# Patient Record
Sex: MALE | Race: WHITE | NOT HISPANIC OR LATINO | Employment: FULL TIME | URBAN - METROPOLITAN AREA
[De-identification: names, ages, dates, MRNs, and addresses within clinical notes are randomized per-mention and may not be internally consistent; named-entity substitution may affect disease eponyms.]

---

## 2023-08-03 LAB
MUCUS, URINE: NORMAL /LPF
RBC, URINE: <1 /HPF (ref 0–5)
WBC, URINE: <1 /HPF (ref 0–5)

## 2023-08-04 LAB — URINE CULTURE: NO GROWTH

## 2023-08-25 PROBLEM — M16.11 PRIMARY OSTEOARTHRITIS OF RIGHT HIP: Status: ACTIVE | Noted: 2023-08-25

## 2023-08-25 PROBLEM — R31.0 HEMATURIA, GROSS: Status: ACTIVE | Noted: 2023-07-07

## 2023-08-25 PROBLEM — R10.9 FLANK PAIN: Status: ACTIVE | Noted: 2023-07-07

## 2023-08-25 PROBLEM — H65.90 SEROUS OTITIS MEDIA: Status: ACTIVE | Noted: 2019-04-24

## 2023-08-25 PROBLEM — I10 HYPERTENSION: Status: ACTIVE | Noted: 2019-05-13

## 2023-08-25 PROBLEM — M25.569 JOINT PAIN, KNEE: Status: ACTIVE | Noted: 2023-08-25

## 2023-08-25 PROBLEM — I25.10 MILD CAD: Status: ACTIVE | Noted: 2023-04-04

## 2023-08-25 PROBLEM — E66.01 MORBID OBESITY (MULTI): Status: ACTIVE | Noted: 2022-09-19

## 2023-08-25 PROBLEM — N40.1 BENIGN PROSTATIC HYPERPLASIA WITH URINARY OBSTRUCTION: Status: ACTIVE | Noted: 2023-08-25

## 2023-08-25 PROBLEM — N13.8 BENIGN PROSTATIC HYPERPLASIA WITH URINARY OBSTRUCTION: Status: ACTIVE | Noted: 2023-08-25

## 2023-08-25 PROBLEM — M25.551 RIGHT HIP PAIN: Status: ACTIVE | Noted: 2023-08-25

## 2023-08-25 PROBLEM — M10.9 GOUT: Status: ACTIVE | Noted: 2019-05-13

## 2023-08-25 PROBLEM — M54.50 ACUTE LOW BACK PAIN: Status: ACTIVE | Noted: 2021-05-25

## 2023-08-25 PROBLEM — R60.0 LOWER EXTREMITY EDEMA: Status: ACTIVE | Noted: 2021-05-25

## 2023-08-25 PROBLEM — Z96.641 STATUS POST TOTAL REPLACEMENT OF RIGHT HIP: Status: ACTIVE | Noted: 2023-08-25

## 2023-08-25 PROBLEM — R39.9 LOWER URINARY TRACT SYMPTOMS (LUTS): Status: ACTIVE | Noted: 2023-08-25

## 2023-08-25 RX ORDER — ALFUZOSIN HYDROCHLORIDE 10 MG/1
1 TABLET, EXTENDED RELEASE ORAL DAILY
COMMUNITY
End: 2023-10-04 | Stop reason: SDUPTHER

## 2023-08-25 RX ORDER — METOPROLOL SUCCINATE 200 MG/1
TABLET, EXTENDED RELEASE ORAL
COMMUNITY
Start: 2023-07-25

## 2023-08-25 RX ORDER — IBUPROFEN 800 MG/1
1 TABLET ORAL 3 TIMES DAILY PRN
COMMUNITY
Start: 2023-05-31

## 2023-08-25 RX ORDER — DICLOFENAC SODIUM 75 MG/1
1 TABLET, DELAYED RELEASE ORAL 2 TIMES DAILY
COMMUNITY
Start: 2022-06-08

## 2023-08-25 RX ORDER — OXYCODONE HYDROCHLORIDE 5 MG/1
1 TABLET ORAL EVERY 8 HOURS PRN
COMMUNITY
Start: 2022-08-12

## 2023-08-25 RX ORDER — AMLODIPINE BESYLATE 5 MG/1
1 TABLET ORAL DAILY
COMMUNITY
Start: 2023-07-25

## 2023-08-25 RX ORDER — ALLOPURINOL 100 MG/1
TABLET ORAL
COMMUNITY
Start: 2022-07-19

## 2023-08-25 RX ORDER — LISINOPRIL 20 MG/1
1 TABLET ORAL DAILY
COMMUNITY
Start: 2022-09-19

## 2023-08-25 RX ORDER — HYDROCHLOROTHIAZIDE 25 MG/1
1 TABLET ORAL DAILY
COMMUNITY
Start: 2023-07-13

## 2023-08-25 RX ORDER — LISINOPRIL 10 MG/1
TABLET ORAL
COMMUNITY
Start: 2022-07-19

## 2023-09-20 ENCOUNTER — HOSPITAL ENCOUNTER (OUTPATIENT)
Dept: DATA CONVERSION | Facility: HOSPITAL | Age: 53
Discharge: HOME | End: 2023-09-20
Payer: COMMERCIAL

## 2023-09-20 DIAGNOSIS — M1A.3720 CHRONIC GOUT DUE TO RENAL IMPAIRMENT, LEFT ANKLE AND FOOT, WITHOUT TOPHUS (TOPHI): ICD-10-CM

## 2023-09-20 DIAGNOSIS — Z00.00 ENCOUNTER FOR GENERAL ADULT MEDICAL EXAMINATION WITHOUT ABNORMAL FINDINGS: ICD-10-CM

## 2023-09-20 DIAGNOSIS — I10 ESSENTIAL (PRIMARY) HYPERTENSION: ICD-10-CM

## 2023-09-20 DIAGNOSIS — E66.01 MORBID (SEVERE) OBESITY DUE TO EXCESS CALORIES (MULTI): ICD-10-CM

## 2023-09-20 DIAGNOSIS — I25.10 ATHEROSCLEROTIC HEART DISEASE OF NATIVE CORONARY ARTERY WITHOUT ANGINA PECTORIS: ICD-10-CM

## 2023-09-20 LAB
ALBUMIN SERPL-MCNC: 4.1 GM/DL (ref 3.5–5)
ALBUMIN/GLOB SERPL: 1.6 RATIO (ref 1.5–3)
ALP BLD-CCNC: 37 U/L (ref 35–125)
ALT SERPL-CCNC: 19 U/L (ref 5–40)
ANION GAP SERPL CALCULATED.3IONS-SCNC: 12 MMOL/L (ref 0–19)
APPEARANCE PLAS: NORMAL
AST SERPL-CCNC: 20 U/L (ref 5–40)
BASOPHILS # BLD AUTO: 0.05 K/UL (ref 0–0.22)
BASOPHILS NFR BLD AUTO: 0.9 % (ref 0–1)
BILIRUB SERPL-MCNC: 0.7 MG/DL (ref 0.1–1.2)
BILIRUB UR QL STRIP.AUTO: NEGATIVE
BUN SERPL-MCNC: 10 MG/DL (ref 8–25)
BUN/CREAT SERPL: 10 RATIO (ref 8–21)
CALCIUM SERPL-MCNC: 9.8 MG/DL (ref 8.5–10.4)
CHLORIDE SERPL-SCNC: 97 MMOL/L (ref 97–107)
CHOLEST SERPL-MCNC: 152 MG/DL (ref 133–200)
CHOLEST/HDLC SERPL: 2.6 RATIO
CLARITY UR: CLEAR
CO2 SERPL-SCNC: 29 MMOL/L (ref 24–31)
COLOR SPUN FLD: NORMAL
COLOR UR: NORMAL
CREAT SERPL-MCNC: 1 MG/DL (ref 0.4–1.6)
DEPRECATED RDW RBC AUTO: 38.5 FL (ref 37–54)
DIFFERENTIAL METHOD BLD: ABNORMAL
EOSINOPHIL # BLD AUTO: 0.11 K/UL (ref 0–0.45)
EOSINOPHIL NFR BLD: 1.9 % (ref 0–3)
ERYTHROCYTE [DISTWIDTH] IN BLOOD BY AUTOMATED COUNT: 11.7 % (ref 11.7–15)
FASTING STATUS PATIENT QL REPORTED: NORMAL
GFR SERPL CREATININE-BSD FRML MDRD: 90 ML/MIN/1.73 M2
GLOBULIN SER-MCNC: 2.5 G/DL (ref 1.9–3.7)
GLUCOSE SERPL-MCNC: 92 MG/DL (ref 65–99)
GLUCOSE UR STRIP.AUTO-MCNC: NEGATIVE MG/DL
HBA1C MFR BLD: 5.4 % (ref 4–6)
HCT VFR BLD AUTO: 44.4 % (ref 41–50)
HDLC SERPL-MCNC: 58 MG/DL
HGB BLD-MCNC: 15.1 GM/DL (ref 13.5–16.5)
HGB UR QL: NEGATIVE
IMM GRANULOCYTES # BLD AUTO: 0.02 K/UL (ref 0–0.1)
KETONES UR QL STRIP.AUTO: NEGATIVE
LDLC SERPL CALC-MCNC: 77 MG/DL (ref 65–130)
LEUKOCYTE ESTERASE UR QL STRIP.AUTO: NEGATIVE
LYMPHOCYTES # BLD AUTO: 1.11 K/UL (ref 1.2–3.2)
LYMPHOCYTES NFR BLD MANUAL: 19 % (ref 20–40)
MCH RBC QN AUTO: 30.6 PG (ref 26–34)
MCHC RBC AUTO-ENTMCNC: 34 % (ref 31–37)
MCV RBC AUTO: 90.1 FL (ref 80–100)
MONOCYTES # BLD AUTO: 0.6 K/UL (ref 0–0.8)
MONOCYTES NFR BLD MANUAL: 10.3 % (ref 0–8)
NEUTROPHILS # BLD AUTO: 3.95 K/UL
NEUTROPHILS # BLD AUTO: 3.95 K/UL (ref 1.8–7.7)
NEUTROPHILS.IMMATURE NFR BLD: 0.3 % (ref 0–1)
NEUTS SEG NFR BLD: 67.6 % (ref 50–70)
NITRITE UR QL STRIP.AUTO: NEGATIVE
NRBC BLD-RTO: 0 /100 WBC
PH UR STRIP.AUTO: 7 [PH] (ref 4.6–8)
PLATELET # BLD AUTO: 249 K/UL (ref 150–450)
PMV BLD AUTO: 10 CU (ref 7–12.6)
POTASSIUM SERPL-SCNC: 4.4 MMOL/L (ref 3.4–5.1)
PROT SERPL-MCNC: 6.6 G/DL (ref 5.9–7.9)
PROT UR STRIP.AUTO-MCNC: NEGATIVE MG/DL
RBC # BLD AUTO: 4.93 M/UL (ref 4.5–5.5)
REFLEX MICROSCOPIC (UA): NORMAL
SODIUM SERPL-SCNC: 138 MMOL/L (ref 133–145)
SP GR UR STRIP.AUTO: 1.01 (ref 1–1.03)
TRIGL SERPL-MCNC: 83 MG/DL (ref 40–150)
TSH SERPL DL<=0.05 MIU/L-ACNC: 1.61 MIU/L (ref 0.27–4.2)
UROBILINOGEN UR QL STRIP.AUTO: NORMAL MG/DL (ref 0–1)
WBC # BLD AUTO: 5.8 K/UL (ref 4.5–11)

## 2023-10-04 ENCOUNTER — PROCEDURE VISIT (OUTPATIENT)
Dept: UROLOGY | Facility: CLINIC | Age: 53
End: 2023-10-04
Payer: COMMERCIAL

## 2023-10-04 DIAGNOSIS — R31.0 HEMATURIA, GROSS: ICD-10-CM

## 2023-10-04 DIAGNOSIS — N13.8 BPH WITH OBSTRUCTION/LOWER URINARY TRACT SYMPTOMS: Primary | ICD-10-CM

## 2023-10-04 DIAGNOSIS — N40.1 BPH WITH OBSTRUCTION/LOWER URINARY TRACT SYMPTOMS: Primary | ICD-10-CM

## 2023-10-04 LAB
POC APPEARANCE, URINE: CLEAR
POC BILIRUBIN, URINE: NEGATIVE
POC BLOOD, URINE: NEGATIVE
POC COLOR, URINE: YELLOW
POC GLUCOSE, URINE: NEGATIVE MG/DL
POC KETONES, URINE: NEGATIVE MG/DL
POC LEUKOCYTES, URINE: NEGATIVE
POC NITRITE,URINE: NEGATIVE
POC PH, URINE: 7 PH
POC PROTEIN, URINE: NEGATIVE MG/DL
POC SPECIFIC GRAVITY, URINE: 1.02
POC UROBILINOGEN, URINE: 0.2 EU/DL

## 2023-10-04 PROCEDURE — 52000 CYSTOURETHROSCOPY: CPT | Performed by: STUDENT IN AN ORGANIZED HEALTH CARE EDUCATION/TRAINING PROGRAM

## 2023-10-04 PROCEDURE — 99213 OFFICE O/P EST LOW 20 MIN: CPT | Performed by: STUDENT IN AN ORGANIZED HEALTH CARE EDUCATION/TRAINING PROGRAM

## 2023-10-04 RX ORDER — ALFUZOSIN HYDROCHLORIDE 10 MG/1
10 TABLET, EXTENDED RELEASE ORAL DAILY
Qty: 30 TABLET | Refills: 11 | Status: SHIPPED | OUTPATIENT
Start: 2023-10-04 | End: 2023-11-07 | Stop reason: SDUPTHER

## 2023-10-04 NOTE — PROGRESS NOTES
Patient ID: Mamadou Warren is a 52 y.o. male.    Procedures    PROCEDURE NOTE:    PREOPERATIVE DIAGNOSIS:  Gross hematuria    POSTOPERATIVE DIAGNOSIS:  BPH    OPERATION:  Flexible Cystourethroscopy      SURGEON:  Shweta Reyna MD    ANESTHESIA:  2%  lidocaine jelly    COMPLICATIONS:  None    EBL: Minimal    SPECIMEN:  Voided urine was not collected and submitted for cytology.    DISPOSITION:  The patient was discharged home after the procedure, per routine.    INDICATIONS: :  Mr. Warren is a 52 y.o. patient with a history of gross hematuria, negative CT,  who presents today for Cystoscopy.     The indications, risks and benefits of this procedure were discussed with the patient, consent was obtained prior to the procedure, and to the best of my judgement the patient seemed to understand and agree to the procedure.    PROCEDURE:  The patient  was brought into the procedure suite and informed consent was reviewed and confirmed. Vital signs were obtained prior to the procedure: There were no vitals taken for this visit..  The patient was escorted onto the stretcher, placed supine, prepped with betadine and draped in the usual standard surgical fashion.  Intraurethral 2% viscous lidocaine jelly was used for local analgesia.  A 16 Ethiopian flexible cystourethroscope was inserted into the urethra.   The penile urethra was normal.  The prostate urethra was enlarged.  Upon entering the bladder the entire bladder was surveyed in a 360 degree fashion.  The left and right ureteral orifices were in normal orthotopic position effluxing clear yellow urine, bilaterally.   There was no evidence of any bladder lesions, foreign objects, stones or evidence of any mucosal changes. The cystoscope was then retroflexed.  The bladder neck was then further examined without any evidence of lesions. The scope was then removed and in an antegrade fashion, the urethra and bladder were again resurveyed with no evidence of additional  lesions.  The cystoscope was then fully removed.   The patient tolerated the procedure well.  Vitals were stable after the procedure.  The patient was able to void and was discharged home.  Verbal and written Post procedure instructions were reviewed with the patient.    IMPRESSION:  History of hematuria likely from prostate    PLAN:  Reassure  Continue alfuzosin 10mg  FU in 1 year    Mamadou Warren is noted in assessment to have a @FLOWCONTINUOUS(18)@/10. The patient's individualized treatment will therefore consist of: Negative.

## 2023-10-04 NOTE — PROGRESS NOTES
Subjective   Patient ID: Mamadou Warren is a 52 y.o. male follow-up hematuria, LUTS. Cystoscopy.     HPI  52-year-old male light ex-smoker, with gross hematuria. He has significant obstructive LUTS. No ED.      Started alfuzosin 10 mg daily at bedtime and symptoms are significantly hany,r he would like to continue it.     Cystoscopy today 10/4/23.     Review of Systems   All other systems reviewed and are negative.        Assessment/Plan   52-year-old male light ex-smoker, with gross hematuria. He has significant obstructive LUTS. No ED.    Cystoscopy today 10/4/23 showed enlarged prostate with no lesions in the bladder     Plan:   Continue Alfuzosin 10mg orally daily  FU in 1 year    Diagnoses and all orders for this visit:  BPH with obstruction/lower urinary tract symptoms  Hematuria, gross

## 2023-11-07 DIAGNOSIS — N13.8 BPH WITH OBSTRUCTION/LOWER URINARY TRACT SYMPTOMS: ICD-10-CM

## 2023-11-07 DIAGNOSIS — N40.1 BPH WITH OBSTRUCTION/LOWER URINARY TRACT SYMPTOMS: ICD-10-CM

## 2023-11-07 RX ORDER — ALFUZOSIN HYDROCHLORIDE 10 MG/1
10 TABLET, EXTENDED RELEASE ORAL DAILY
Qty: 90 TABLET | Refills: 3 | Status: SHIPPED | OUTPATIENT
Start: 2023-11-07 | End: 2024-11-06

## 2024-01-30 ENCOUNTER — APPOINTMENT (OUTPATIENT)
Dept: ORTHOPEDIC SURGERY | Facility: CLINIC | Age: 54
End: 2024-01-30
Payer: COMMERCIAL

## 2024-02-01 ENCOUNTER — OFFICE VISIT (OUTPATIENT)
Dept: ORTHOPEDIC SURGERY | Facility: CLINIC | Age: 54
End: 2024-02-01
Payer: COMMERCIAL

## 2024-02-01 ENCOUNTER — HOSPITAL ENCOUNTER (OUTPATIENT)
Dept: RADIOLOGY | Facility: CLINIC | Age: 54
Discharge: HOME | End: 2024-02-01
Payer: COMMERCIAL

## 2024-02-01 VITALS — BODY MASS INDEX: 41.52 KG/M2 | HEIGHT: 68 IN | WEIGHT: 274 LBS

## 2024-02-01 DIAGNOSIS — M70.61 TROCHANTERIC BURSITIS OF RIGHT HIP: ICD-10-CM

## 2024-02-01 DIAGNOSIS — Z96.641 HISTORY OF TOTAL HIP ARTHROPLASTY, RIGHT: ICD-10-CM

## 2024-02-01 DIAGNOSIS — Z96.641 HISTORY OF TOTAL HIP ARTHROPLASTY, RIGHT: Primary | ICD-10-CM

## 2024-02-01 PROCEDURE — 73502 X-RAY EXAM HIP UNI 2-3 VIEWS: CPT | Mod: RIGHT SIDE | Performed by: RADIOLOGY

## 2024-02-01 PROCEDURE — 73502 X-RAY EXAM HIP UNI 2-3 VIEWS: CPT | Mod: RT

## 2024-02-01 PROCEDURE — 99215 OFFICE O/P EST HI 40 MIN: CPT | Performed by: STUDENT IN AN ORGANIZED HEALTH CARE EDUCATION/TRAINING PROGRAM

## 2024-02-01 ASSESSMENT — PAIN DESCRIPTION - DESCRIPTORS: DESCRIPTORS: ACHING

## 2024-02-01 ASSESSMENT — PAIN - FUNCTIONAL ASSESSMENT: PAIN_FUNCTIONAL_ASSESSMENT: 0-10

## 2024-02-01 ASSESSMENT — PAIN SCALES - GENERAL: PAINLEVEL_OUTOF10: 3

## 2024-02-01 NOTE — PROGRESS NOTES
JUSTICE/TKA Related Summary           L hip: N  L knee: N  R hip  8/12/2022: Primary JUSTICE (Dr. Aren Joyce at , op report in system). Doing well overall.  R knee: N  Lumbar surgery/pathology: N            CC/SUBJECTIVE/HPI            PCP: Blayne Gunn MD  Referring provider: No ref. provider found  Occupation: Not specified  Hobbies: Exercising, going to sporting events  Mamadou Warren is a 53 y.o. male presenting for follow-up for a R primary JUSTICE performed by Dr. Aren Joyce (no longer with [facility]) on 8/12/2022.  He last saw Dr. Joyce on 10/26/2022, at which time he was doing well.  Since that time, he has begun to develop lateral hip pain radiating down to his lateral knee. This pain keeps him up at night and prevents him from sleeping on that side.  He also has minor pain along his anterior hip, but this is much less of a concern for him.    R hip  Symptoms  Pain: 3/10  Onset: chronic/gradual  Duration: 3mo-1yr  Location: groin, side of hip, and buttock  Quality: dull/ache and sharp/stab  Limitations: morning stiffness, pain after activity, and night pain  Ambulation limit due to pain: unlimited but hurts later  Other symptoms: none  Treatment  Tried: OTCs  Most recent injection date: none  Assistive device: none  Treatment attempted for 3mo-1yr and is partially effective            HISTORIES (System Generated and Pt-Reported on Form Today)       Dental  Pt-reported: No active issues     PMH  Pt-reported: Denies heart/lung/kidney/liver issues, DM, stroke, seizure, bariatric surgery, anticoag, MRSA, cancer, personal/familial coagulopathies except: none  System-generated (PMH, problem list both included since EMR change caused discrepancies): No past medical history on file.   Patient Active Problem List   Diagnosis    Gout    Hematuria, gross    Hypertension    Lower extremity edema    Mild CAD    Morbid obesity (CMS/HCC)    Primary osteoarthritis of right hip    Status  post total replacement of right hip    Benign prostatic hyperplasia with urinary obstruction    Lower urinary tract symptoms (LUTS)     PSH  Pt-reported: Per above.   System-generated: No past surgical history on file.    Family Hx  Pt-reported clot/coagulopathies: none  System-generated: No family history on file.    Social Hx  Pt-reported substance use: EtOH (social), occasional marijuana  System-generated:      Allergies  Pt-reported (meds, metals): N  System-generated:   Allergies   Allergen Reactions    Other Unknown     contrast allergy premed pack kit     Current Meds  System-generated:   Current Outpatient Medications:     alfuzosin (Uroxatral) 10 mg 24 hr tablet, Take 1 tablet (10 mg) by mouth once daily. Do not crush, chew, or split., Disp: 90 tablet, Rfl: 3    allopurinol (Zyloprim) 100 mg tablet, Allopurinol 100 MG Oral Tablet  Refills: 0      Start : 19-Jul-2022  Active, Disp: , Rfl:     amLODIPine (Norvasc) 5 mg tablet, Take 1 tablet (5 mg) by mouth once daily., Disp: , Rfl:     diclofenac (Voltaren) 75 mg EC tablet, Take 1 tablet (75 mg) by mouth 2 times a day., Disp: , Rfl:     hydroCHLOROthiazide (HYDRODiuril) 25 mg tablet, Take 1 tablet (25 mg) by mouth once daily., Disp: , Rfl:     ibuprofen 800 mg tablet, Take 1 tablet (800 mg) by mouth 3 times a day as needed (for back pain)., Disp: , Rfl:     lisinopril 10 mg tablet, Lisinopril 10 MG Oral Tablet  Refills: 0      Start : 19-Jul-2022  Active 30 Tablet Pack, Disp: , Rfl:     lisinopril 20 mg tablet, Take 1 tablet (20 mg) by mouth once daily., Disp: , Rfl:     metoprolol succinate XL (Toprol-XL) 200 mg 24 hr tablet, = 1 tab(s), Oral, daily, # 90 tab(s), 3 Refill(s), Type: Maintenance, Pharmacy: [facility], TAKE 1 TABLET DAILY, 68, in, 09/19/22 8:22:00 EDT, Height Measured, 252, lb, 07/07/23 15:18:00 EDT, Weight Measured, Disp: , Rfl:     oxyCODONE (Roxicodone) 5 mg immediate release tablet, Take 1 tablet (5 mg) by mouth every 8  "hours if needed., Disp: , Rfl:     ROS: Neg except HPI            OBJECTIVE            Physical exam  Estimated body mass index is 41.07 kg/m² as calculated from the following:    Height as of 9/26/23: 1.702 m (5' 7\").    Weight as of 9/26/23: 119 kg (262 lb 3.2 oz).  Gen/psych: NAD, conversational, appropriate    Ambulation  Gait: normal  Assistive device: none  Spine  Lumbar spine tenderness: neg  Limited ROM: neg, with no radiation or increased pain with flex/ex, lateral bending  Straight leg raise test: neg    Focused MSK exam: R  JUSTICE  Trendelenberg test: neg  Skin/incision: well healed, likely direct anterior approach  Tenderness: peritrochanteric and extending along the IT band down to the knee  Pain with log roll: neg  ROM: within expected range, nonpainful  Neurovascular    Strength: 5/5 hip/knee/ankle flexion and extension  Sensory (L2-S1): SILT throughout lower extremity  Edema/stasis: no pitting edema  Pulse: DP 1+, PT 1+    Imaging  2/1/2024 R hip radiographs (AP Pelvis, AP/Lateral) on my read: JUSTICE components in acceptable position with no sign of gross implant/hardware failure.              ASSESSMENT & PLAN           Patient verbalized understanding of below A&P. All questions answered.  #1 S/p 8/2022 R Primary JUSTICE (Dr. Joyce at ), doing well  Imaging and outcomes reviewed with pt.  Activity/Therapy/Incision: Continue low impact exercise and weight mgmt  Pain: OTCs, ice as needed.  Dental: Discussed dental work and abx prophylaxis.  Follow-up: 2yrs with XRs (~2/2026)  #2: R Abductor tendonitis / GT bursitis  Differential includes lumbar radiculopathy. History, exam, and imaging are most consistent with abductor tendonitis / GT bursitis.  There is no sign of infection or radiographic/clinical evidence of component failure. The pain appears to be related to soft-tissue abductor tendonitis / trochanteric bursitis. First line treatment is conservative with activity modification, local pain control " modalities (ice, heat, topical anti-inflammatories), PT, and OTC NSAIDs. Second line treatment is a CSI.  The patient elected for PT and a CSI. I provided a referral to my colleague, who is excellent at performing ultrasound guided GT injections.  Follow-up: As needed  Other considerations discussed  No suspicion for PJI.  Higher lifetime risk of revision with index surgery at young age.  PMH/PSH discussed  Elective surgery requirement of BMI<40.  Gout, mild CAD  Lower extremity edema list in chart not evident on exam today

## 2024-02-14 ENCOUNTER — OFFICE VISIT (OUTPATIENT)
Dept: ORTHOPEDIC SURGERY | Facility: CLINIC | Age: 54
End: 2024-02-14
Payer: COMMERCIAL

## 2024-02-14 DIAGNOSIS — M70.61 TROCHANTERIC BURSITIS OF RIGHT HIP: Primary | ICD-10-CM

## 2024-02-14 DIAGNOSIS — M67.951 TENDINOPATHY OF RIGHT GLUTEUS MEDIUS: ICD-10-CM

## 2024-02-14 PROCEDURE — 99204 OFFICE O/P NEW MOD 45 MIN: CPT | Performed by: FAMILY MEDICINE

## 2024-02-14 PROCEDURE — 20611 DRAIN/INJ JOINT/BURSA W/US: CPT | Performed by: FAMILY MEDICINE

## 2024-02-14 RX ORDER — TRIAMCINOLONE ACETONIDE 40 MG/ML
20 INJECTION, SUSPENSION INTRA-ARTICULAR; INTRAMUSCULAR
Status: COMPLETED | OUTPATIENT
Start: 2024-02-14 | End: 2024-02-14

## 2024-02-14 RX ORDER — LIDOCAINE HYDROCHLORIDE 10 MG/ML
1.5 INJECTION INFILTRATION; PERINEURAL
Status: COMPLETED | OUTPATIENT
Start: 2024-02-14 | End: 2024-02-14

## 2024-02-14 RX ADMIN — TRIAMCINOLONE ACETONIDE 20 MG: 40 INJECTION, SUSPENSION INTRA-ARTICULAR; INTRAMUSCULAR at 10:12

## 2024-02-14 RX ADMIN — LIDOCAINE HYDROCHLORIDE 1.5 ML: 10 INJECTION INFILTRATION; PERINEURAL at 10:12

## 2024-02-14 NOTE — LETTER
February 14, 2024     Landon Up MD  [id number]  Department Of Orthopedics  [facility] [address]    Patient: Mamadou Warren   YOB: 1970   Date of Visit: 2/14/2024       Dear Dr. Landon Up MD:    Thank you for referring Mamadou Warren to me for evaluation. Below are my notes for this consultation.  If you have questions, please do not hesitate to call me. I look forward to following your patient along with you.       Sincerely,     Mike Live, DO      CC: No Recipients  ______________________________________________________________________________________    ** Please excuse any errors in grammar or translation related to this dictation. Voice recognition software was utilized to prepare this document. **    Assessment & Plan:  Clinical presentation most consistent with greater trochanteric pain syndrome and glute med tendinopathy.  Discuss with patient that the underlying issue with this problem is weakened hip abductors causing traction on the trochanteric insertion. This traction causes friction leading to pain and inflammation.  Previously referred for PT and encouraged patient to schedule this to address the muscle strengthening and hip mobility. As patient has not responded to oral analgesics, was offered to complete steroid injection today. Patient was agreeable to have completed, see below.  Return precautions given. F/u as needed for recurrence of symptoms.  If having short-interval recurrence of pain, may benefit from PRP injection or Tenex procedure in future.    Copy of today's encounter sent to Dr. Up for review.      Chief complaint:  Right hip pain    HPI:  52 y/o patient, hx of right total hip arthroplasty 8/2022, presents with right lateral hip pain. Reports always having some residual lateral hip pain since the surgery but was able to tolerate until the past 6 months. No new injury reported to explain his acute increase in pain. Pain is localized to  lateral hip. It is exacerbated with activity and light touch over the site.  Recently saw Dr. Up and was informed his surgical hardware all appears normal. His pain is thought to be derived from hip abductors and trochanteric bursitis. He was referred to be evaluated for US-guided CSI.     Exam:  Right Hip Exam:  Normal gait  No warmth, erythema or ecchymosis overlying.  Active flexion >90 degrees with grossly normal extension, abduction, adduction, IR and ER.  TTP over greater trochanter, glute tendons;  NTTP over proximal ITB, ischial tuberosity.  [5]/5 strength of hip flexion, abduction, & adduction  SILT  [ - ]Log roll pain, [ - ]FADIR pain, [ - ]THELMA pain, [ - ]Stinchfield,  [ - ]Scour    Results:  X-rays of right hip obtained 2/1/2024 reviewed and independently interpreted as no acute periprosthetic fractures and normal postsurgical appearance.  Degenerative changes of left hip joint.     Reviewed referral note.     Procedure:  Patient ID: Mamadou Warren is a 53 y.o. male.    L Inj/Asp: R greater trochanteric bursa on 2/14/2024 10:12 AM  Indications: pain  Details: 25 G needle, ultrasound-guided lateral approach  Medications: 20 mg triamcinolone acetonide 40 mg/mL; 1.5 mL lidocaine 10 mg/mL (1 %)  Outcome: tolerated well, no immediate complications  Procedure, treatment alternatives, risks and benefits explained, specific risks discussed. Consent was given by the patient. Immediately prior to procedure a time out was called to verify the correct patient, procedure, equipment, support staff and site/side marked as required. Patient was prepped and draped in the usual sterile fashion.

## 2024-02-14 NOTE — PROGRESS NOTES
** Please excuse any errors in grammar or translation related to this dictation. Voice recognition software was utilized to prepare this document. **    Assessment & Plan:  Clinical presentation most consistent with greater trochanteric pain syndrome and glute med tendinopathy.  Discuss with patient that the underlying issue with this problem is weakened hip abductors causing traction on the trochanteric insertion. This traction causes friction leading to pain and inflammation.  Previously referred for PT and encouraged patient to schedule this to address the muscle strengthening and hip mobility. As patient has not responded to oral analgesics, was offered to complete steroid injection today. Patient was agreeable to have completed, see below.  Return precautions given. F/u as needed for recurrence of symptoms.  If having short-interval recurrence of pain, may benefit from PRP injection or Tenex procedure in future.    Copy of today's encounter sent to Dr. Up for review.      Chief complaint:  Right hip pain    HPI:  52 y/o patient, hx of right total hip arthroplasty 8/2022, presents with right lateral hip pain. Reports always having some residual lateral hip pain since the surgery but was able to tolerate until the past 6 months. No new injury reported to explain his acute increase in pain. Pain is localized to lateral hip. It is exacerbated with activity and light touch over the site.  Recently saw Dr. Up and was informed his surgical hardware all appears normal. His pain is thought to be derived from hip abductors and trochanteric bursitis. He was referred to be evaluated for US-guided CSI.     Exam:  Right Hip Exam:  Normal gait  No warmth, erythema or ecchymosis overlying.  Active flexion >90 degrees with grossly normal extension, abduction, adduction, IR and ER.  TTP over greater trochanter, glute tendons;  NTTP over proximal ITB, ischial tuberosity.  [5]/5 strength of hip flexion, abduction, &  adduction  SILT  [ - ]Log roll pain, [ - ]FADIR pain, [ - ]THELMA pain, [ - ]Stinchfield,  [ - ]Scour    Results:  X-rays of right hip obtained 2/1/2024 reviewed and independently interpreted as no acute periprosthetic fractures and normal postsurgical appearance.  Degenerative changes of left hip joint.     Reviewed referral note.     Procedure:  Patient ID: Mamadou Warren is a 53 y.o. male.    L Inj/Asp: R greater trochanteric bursa on 2/14/2024 10:12 AM  Indications: pain  Details: 25 G needle, ultrasound-guided lateral approach  Medications: 20 mg triamcinolone acetonide 40 mg/mL; 1.5 mL lidocaine 10 mg/mL (1 %)  Outcome: tolerated well, no immediate complications  Procedure, treatment alternatives, risks and benefits explained, specific risks discussed. Consent was given by the patient. Immediately prior to procedure a time out was called to verify the correct patient, procedure, equipment, support staff and site/side marked as required. Patient was prepped and draped in the usual sterile fashion.

## 2024-03-07 ENCOUNTER — APPOINTMENT (OUTPATIENT)
Dept: PHYSICAL THERAPY | Facility: CLINIC | Age: 54
End: 2024-03-07
Payer: COMMERCIAL

## 2024-03-08 ENCOUNTER — OFFICE VISIT (OUTPATIENT)
Dept: PRIMARY CARE | Facility: CLINIC | Age: 54
End: 2024-03-08
Payer: COMMERCIAL

## 2024-03-08 VITALS
DIASTOLIC BLOOD PRESSURE: 74 MMHG | TEMPERATURE: 97.5 F | HEART RATE: 83 BPM | BODY MASS INDEX: 40.26 KG/M2 | SYSTOLIC BLOOD PRESSURE: 124 MMHG | WEIGHT: 264.8 LBS | OXYGEN SATURATION: 96 %

## 2024-03-08 DIAGNOSIS — H66.90 ACUTE OTITIS MEDIA, UNSPECIFIED OTITIS MEDIA TYPE: Primary | ICD-10-CM

## 2024-03-08 PROCEDURE — 99213 OFFICE O/P EST LOW 20 MIN: CPT | Performed by: FAMILY MEDICINE

## 2024-03-08 PROCEDURE — 3074F SYST BP LT 130 MM HG: CPT | Performed by: FAMILY MEDICINE

## 2024-03-08 PROCEDURE — 1036F TOBACCO NON-USER: CPT | Performed by: FAMILY MEDICINE

## 2024-03-08 PROCEDURE — 3078F DIAST BP <80 MM HG: CPT | Performed by: FAMILY MEDICINE

## 2024-03-08 RX ORDER — PREDNISONE 10 MG/1
TABLET ORAL
Qty: 18 TABLET | Refills: 8 | Status: SHIPPED | OUTPATIENT
Start: 2024-03-08

## 2024-03-08 RX ORDER — AMOXICILLIN 875 MG/1
875 TABLET, FILM COATED ORAL 2 TIMES DAILY
Qty: 20 TABLET | Refills: 0 | Status: SHIPPED | OUTPATIENT
Start: 2024-03-08 | End: 2024-03-18

## 2024-03-08 ASSESSMENT — PAIN SCALES - GENERAL: PAINLEVEL: 0-NO PAIN

## 2024-03-08 ASSESSMENT — PATIENT HEALTH QUESTIONNAIRE - PHQ9
2. FEELING DOWN, DEPRESSED OR HOPELESS: NOT AT ALL
1. LITTLE INTEREST OR PLEASURE IN DOING THINGS: NOT AT ALL
SUM OF ALL RESPONSES TO PHQ9 QUESTIONS 1 AND 2: 0

## 2024-03-08 NOTE — PROGRESS NOTES
Subjective   Patient ID: Mamadou Warren is a 53 y.o. male who presents for Sinus Problem and Earache.    HPI patient required congestion and runny nose about a week ago while on vacation in [place].  During the air travel home about 2 days ago he developed exquisite ear pain bilaterally.  He has a history of ear infections.  He does not have a fever at this time and his hearing is not diminished.  He has been using Robitussin CF without significant relief.    Review of Systems  Constitutional: Patient is negative for fever, fatigue, weight change.  HEENT: Patient is positive for pain in the ears right worse than left.  Patient is positive for congestion and postnasal drip.  He is negative for change in hearing, vision, swallow.  Cardio: Patient is negative for chest pain, lower extremity edema.  Pulmonary: Patient is negative for cough, shortness of breath.  Objective   /74   Pulse 83   Temp 36.4 °C (97.5 °F)   Wt 120 kg (264 lb 12.8 oz)   SpO2 96%   BMI 40.26 kg/m²     Physical Exam  General: Awake and alert no apparent distress.  HEENT: Moist oral mucosa no cervical lymphadenopathy.  TMs are retracted and both are sclerotic.  However there is bright speckled blood behind each TM.  Cardio: Heart S1-S2 no murmur rub or gallop.  Pulmonary: Lungs clear to auscultation bilaterally.  Assessment/Plan   Problem List Items Addressed This Visit    None  Visit Diagnoses         Codes    Acute otitis media, unspecified otitis media type    -  Primary H66.90    Relevant Medications needs better control.  Begin antibiotics and oral steroids.  Patient is intolerant to nasal steroids.    amoxicillin (Amoxil) 875 mg tablet    predniSONE (Deltasone) 10 mg tablet

## 2024-03-11 ENCOUNTER — EVALUATION (OUTPATIENT)
Dept: PHYSICAL THERAPY | Facility: CLINIC | Age: 54
End: 2024-03-11
Payer: COMMERCIAL

## 2024-03-11 DIAGNOSIS — M70.61 TROCHANTERIC BURSITIS OF RIGHT HIP: Primary | ICD-10-CM

## 2024-03-11 PROCEDURE — 97110 THERAPEUTIC EXERCISES: CPT | Mod: GP | Performed by: PHYSICAL THERAPIST

## 2024-03-11 PROCEDURE — 97535 SELF CARE MNGMENT TRAINING: CPT | Mod: GP | Performed by: PHYSICAL THERAPIST

## 2024-03-11 ASSESSMENT — ENCOUNTER SYMPTOMS
OCCASIONAL FEELINGS OF UNSTEADINESS: 0
DEPRESSION: 0
LOSS OF SENSATION IN FEET: 0

## 2024-03-11 ASSESSMENT — PAIN SCALES - GENERAL: PAINLEVEL_OUTOF10: 3

## 2024-03-11 ASSESSMENT — PATIENT HEALTH QUESTIONNAIRE - PHQ9
2. FEELING DOWN, DEPRESSED OR HOPELESS: NOT AT ALL
SUM OF ALL RESPONSES TO PHQ9 QUESTIONS 1 AND 2: 0
1. LITTLE INTEREST OR PLEASURE IN DOING THINGS: NOT AT ALL

## 2024-03-11 ASSESSMENT — PAIN - FUNCTIONAL ASSESSMENT: PAIN_FUNCTIONAL_ASSESSMENT: 0-10

## 2024-03-11 NOTE — PROGRESS NOTES
"      Physical Therapy  Physical Therapy Orthopedic Evaluation      Patient Name: Mamadou Warren  MRN: [id number]  Today's Date: 3/11/2024  Time Calculation  Start Time: 1052  Stop Time: 1140  Time Calculation (min): 48 min  PT Evaluation Time Entry  PT Evaluation (Low) Time Entry: 17  PT Therapeutic Procedures Time Entry  Therapeutic Exercise Time Entry: 14  Self-Care/Home Mgmt Training: 10       Insurance:    Number of Treatments Authorized: 1 of 20        Insurance Type: Payor: MEDICAL [facility] / Plan: [employer] [facility] HMO / Product Type: *No Product type* /     Current Problem  1. Trochanteric bursitis of right hip  Referral to Physical Therapy    Follow Up In Physical Therapy          General:  Reason for Referral: R hip pain  Referred By: Dr. Landon Up    Past Medical History  Past Medical History Relevant to Rehab: R JUSTICE (DOS: 10/12/2022); HTN    Precautions:   Precautions  Precautions Comment: None    Medical History Form: Reviewed (scanned into chart)    Subjective:   Subjective   General Comment: Patient presents to physical therapy for R lateral hip pain that progressively worsened ~6 months after the surgery. He notes that it was always present after the surgery but intensity and \"sensitivity to touch/pressure\" worsened. He notes that his surgeon has left the system so he saw a new MD.    Onset Date: Onset Date: 04/01/23    Current Condition:   Worse    Prior Functional Level: Prior Function Per Pt/Caregiver Report  Level of [place]: Independent with ADLs and functional transfers  Vocational: Part time employment ()    Pain:  Pain Assessment: 0-10  Pain Score: 3 (Current)  Pain Location: Hip (#1 (C,V): R anterolateral hip, 1-4/10, \"tightness/achy\"; #2 (C,V): R anterolateral distal thigh, 1-3/10, \"tingling/sensitive\")  Effect of Pain on Daily Activities: Increased #1 with prolonged positioning or activity in weight bearing  Patient's Stated Pain Goal: 3    Previous " "Interventions/Treatments/Previous Tests & Imaging: Physical Therapy Comment: Medical Management: Cortisone Injection Lateral Hip; Physical Therapy after surgery at home but didn't participate in OP PT    Patients Living Environment: Home Living Comment: Multi-story home    Primary Language: English    Patient's Goal(s) for Therapy: Reduce pain to allow for return to ADL's and PFL    Red Flags: Do you have any of the following?         Red Flags: None    Objective:  Objective   HIP    Observation  Observation Comment: Guarded hip movements R hip  Hip Palpation/Joint Mobility   Palpation / Joint Mobility Comment: Tenderness R TFL, Iliopsoas tendon R; Hypersensitivity to light touch anterolateral thigh  Lumbar AROM  Lumbar AROM WFL: yes (No reproduction on hip symptoms)  Hip AROM  R hip flexion: (125°): 100°  L hip flexion: (125°): 105°  R hip abduction: (45°): 40°  R hip extension: (10°): 8°  L hip extension: (10°): 10°  R hip ER: (45°): 30 (#1)  L hip ER: (45°): 40°  R hip IR: (45°): 10° (#1)  L hip IR: (45°): 20°  Hip PROM  Hip PROM WFL:  (Slight change with firm end-feel and guarding note R)  Specific Lower Extremity MMT 5/5 Bilateral Unless Noted Below  R Iliopsoas: (5/5): 4/5 (#1)  L Iliopsoas: (5/5): 4+/5  R Gluteals (prone): (5/5): 4/5  L Gluteals (prone): (5/5): 4+/5  R Gluteals (sidelying): (5/5): 4-/5  L Gluteals (sidelying): (5/5): 4+/5  R Hip External Rotation: (5/5): 4/5  L Hip External Rotation: (5/5): 4+/5    Special Tests  Supine SLR: (Negative): B: 50° with HS \"tightness\"      Outcome Measures:  Other Measures  Lower Extremity Funtional Score (LEFS): 71/80 (88.75%)     Treatment Performed:  Therapeutic Exercise  Therapeutic Exercise Activity 1: HEP Education and demonstration with sets and reps as noted. Pt with good understanding and demonstration.    Other Activity  Other Activity 1: SCHM: Discussed self-monitoring and management strategies for reducing prolonged activities and ADL's. Educated on " change of position desensitization for hypersensitivity in lower anterolateral thigh.      Outpatient Education  Individual(s) Educated: Patient  Education Provided: Home Exercise Program  Patient/Caregiver Demonstrated Understanding: yes  Education Comment: Access Code: [email or web address]  URL: [email or web address]  Date: 03/11/2024  Prepared by: Khari Gregory    Exercises  - Supine Hamstring Stretch with Strap  - 1 x daily - 7 x weekly - 1 sets - 3 reps - 30 hold  - Modified Eris Stretch  - 1 x daily - 7 x weekly - 1 sets - 3 reps - 30 hold  - Supine Bridge  - 1 x daily - 5-6 x weekly - 2 sets - 10 reps  - Standing Hip Abduction (Mirrored)  - 1 x daily - 5-6 x weekly - 2 sets - 10 reps    Assessment:   Patient is 53 y.o. year old who presents to physical therapy with signs and symptoms consistent with right hip pain. Patient has decreased ROM and strength limiting functional mobility and ADLs. Patient's limited lateral hip and posterior hip strength could be contributing to difficulty with prolonged and repetitive weight bearing. He also could possibly have some hypersensitivity along anterolateral thigh which could be possibly be reduced with desensitization. Patient would benefit from skilled physical therapy in order to address the stated deficits and return to daily tasks with reduced pain and improved function. Will assess response overall and monitor progression to assist with return to PFL.    SINSS:  Severity: Moderate  Irritability: Moderate  Nature: MSK R hip  Stage: Sub-Acute  Stability: Stable    Rehab Prognosis: Good      Plan:  Treatment/Interventions: Electrical stimulation, Education/ Instruction, Dry needling, Neuromuscular re-education, Self care/ home management, Therapeutic activities, Therapeutic exercises, Manual therapy  PT Plan: Skilled PT  PT Frequency: 2 times per week  Duration: 6 weeks  Onset Date: 04/01/23  Rehab Potential: Good    Goals: Set and discussed today  STG  (Expected End 3/31/2024)  1) Patient will improve LEFS score by 9 points in order to perform functional activities at home and in the community.  2) Patient will be able to complete ADLs with pain less than 2/10 in hip.  3) Pt will improve hip flexion ROM by 5 degrees to be able to complete ADLs with less difficult.  4) Patient will be independent with HEP to allow for continued improvement in daily tasks at home and in the community in 3 visits.     LTG (Expected End 4/21/2024)  1) Patient will have 5/5 strength in lateral hip musculature to aid in stability with ambulation on varied surfaces in community.  2) Patient will be able to perform proper squatting technique in order to prevent increased pain with daily tasks.  3) Patient will be able to perform >30 seconds of SLS on even ground in order to allow for safe ambulation and reduced fall risk within the community.  4) Patient will improve LEFS score by to >/=70/80 in order to perform functional activities at home and in the community by discharge.        Plan of care was developed with input and agreement by the patient        Khari Gregory PT      This note was dictated with voice recognition software. It has not been proofread for grammatical errors, typographical mistakes or other semantic inconsistencies.

## 2024-03-18 ENCOUNTER — TREATMENT (OUTPATIENT)
Dept: PHYSICAL THERAPY | Facility: CLINIC | Age: 54
End: 2024-03-18
Payer: COMMERCIAL

## 2024-03-18 DIAGNOSIS — M70.61 TROCHANTERIC BURSITIS OF RIGHT HIP: ICD-10-CM

## 2024-03-18 PROCEDURE — 97112 NEUROMUSCULAR REEDUCATION: CPT | Mod: GP | Performed by: PHYSICAL THERAPIST

## 2024-03-18 PROCEDURE — 97140 MANUAL THERAPY 1/> REGIONS: CPT | Mod: GP | Performed by: PHYSICAL THERAPIST

## 2024-03-18 PROCEDURE — 97110 THERAPEUTIC EXERCISES: CPT | Mod: GP | Performed by: PHYSICAL THERAPIST

## 2024-03-18 ASSESSMENT — PAIN SCALES - GENERAL: PAINLEVEL_OUTOF10: 1

## 2024-03-18 ASSESSMENT — PAIN - FUNCTIONAL ASSESSMENT: PAIN_FUNCTIONAL_ASSESSMENT: 0-10

## 2024-03-18 NOTE — PROGRESS NOTES
"  Physical Therapy Treatment    Patient Name: Mamadou Warren  MRN: [id number]  Today's Date: 3/18/2024  Time Calculation  Start Time: 1000  Stop Time: 1043  Time Calculation (min): 43 min  PT Therapeutic Procedures Time Entry  Manual Therapy Time Entry: 15  Neuromuscular Re-Education Time Entry: 8  Therapeutic Exercise Time Entry: 17       Current Problem  1. Trochanteric bursitis of right hip  Follow Up In Physical Therapy          Insurance:  Number of Treatments Authorized: 1 of 20        Payor: MEDICAL [facility] / Plan: [facility] HMO / Product Type: *No Product type* /     Subjective   General  Reason for Referral: R hip pain  Referred By: Dr. Landon Up  Past Medical History Relevant to Rehab: R JUSTICE (DOS: 10/12/2022); HTN  General Comment: Patient states that he has some increased soreness in the hip possibly from the exercises but does note that he feels there has been some slight improvement even in the short-term from starting the strengthening exercises.  Patient notes that he feels the positive effects of the HEP.    Performing HEP?: Yes    Precautions     Pain  Pain Assessment: 0-10  Pain Score: 1  Pain Location: Hip (#1 (C,V): R anterolateral hip, 1-4/10, \"tightness/achy\"; #2 (C,V): R anterolateral distal thigh, 1-3/10, \"tingling/sensitive\")       Objective   HIP    Observation  Observation Comment: Guarded hip movements R hip  Hip Palpation/Joint Mobility   Palpation / Joint Mobility Comment: Tenderness R TFL, Iliopsoas tendon R; Hypersensitivity to light touch anterolateral thigh  Lumbar AROM     Hip AROM  R hip flexion: (125°): 100°  L hip flexion: (125°): 105°  R hip abduction: (45°): 40°  R hip extension: (10°): 8°  L hip extension: (10°): 10°  R hip ER: (45°): 30 (#1)  L hip ER: (45°): 40°  R hip IR: (45°): 10° (#1)  L hip IR: (45°): 20°    Treatments:    Therapeutic Exercise  Therapeutic Exercise Activity 1: SciFit (Seat 13), L2, 6 mins  Therapeutic Exercise " Activity 2: R/L lateral steps with yellow loop x 4 laps (40 feet each direction = 1 lap)  Therapeutic Exercise Activity 3: F/B diagonal steps with yellow loop x 2 laps (40 feet  each direction = 1 lap)  Therapeutic Exercise Activity 4: Bridge DL 2x20 in supine  Therapeutic Exercise Activity 5: Clamshells R, Green, 2x15  Therapeutic Exercise Activity 6: Total Gym Squats (L7), 2x15  Therapeutic Exercise Activity 7: Hip AROM in standing (flex/ER-Abd/IR/Ext/Add) R, 1x15    Balance/Neuromuscular Re-Education  Balance/Neuromuscular Re-Education Activity 1: Tiltboard Balance M/L, 5x30 secs  Balance/Neuromuscular Re-Education Activity 2: SLS, EO, Firm, R/L, 15 sec x 10 each    Manual Therapy  Manual Therapy Activity 1: STM R anterior hip (TFL, quad, psoas)  Manual Therapy Activity 2: LAD, Gr. III and sustained, in supine    Assessment:  PT Assessment  Assessment Comment: Able to progress strengthening and range of motion program today for the right hip with fair tolerance.  Patient fatigued throughout session with breakdown in form and technique requiring verbal and tactile cueing throughout.  Patient was able to correct with this cueing but continued to have challenges with this aspect as session progressed.  Patient also demonstrated decreased soft tissue and joint mobility of the right hip possibly contributing to symptoms.  Will assess response to patient's session next visit and and progress per tolerance.    Plan:     PT Plan: Skilled PT (Progress strengthening, range of motion, flexibility and balance for ADLs)        Onset Date: 04/01/23       Goals:       Khari Gregory, PT    This note was dictated with voice recognition software. It has not been proofread for grammatical errors, typographical mistakes or other semantic inconsistencies.

## 2024-03-25 ENCOUNTER — APPOINTMENT (OUTPATIENT)
Dept: PHYSICAL THERAPY | Facility: CLINIC | Age: 54
End: 2024-03-25
Payer: COMMERCIAL

## 2024-04-01 ENCOUNTER — APPOINTMENT (OUTPATIENT)
Dept: PHYSICAL THERAPY | Facility: CLINIC | Age: 54
End: 2024-04-01
Payer: COMMERCIAL

## 2024-04-08 ENCOUNTER — APPOINTMENT (OUTPATIENT)
Dept: PHYSICAL THERAPY | Facility: CLINIC | Age: 54
End: 2024-04-08
Payer: COMMERCIAL

## 2024-07-08 DIAGNOSIS — I10 HYPERTENSION, UNSPECIFIED TYPE: ICD-10-CM

## 2024-07-09 RX ORDER — HYDROCHLOROTHIAZIDE 25 MG/1
25 TABLET ORAL DAILY
Qty: 90 TABLET | Refills: 0 | Status: SHIPPED | OUTPATIENT
Start: 2024-07-09

## 2024-07-17 DIAGNOSIS — I10 HYPERTENSION, UNSPECIFIED TYPE: ICD-10-CM

## 2024-07-17 RX ORDER — AMLODIPINE BESYLATE 5 MG/1
5 TABLET ORAL DAILY
Qty: 90 TABLET | Refills: 3 | Status: SHIPPED | OUTPATIENT
Start: 2024-07-17

## 2024-09-02 DIAGNOSIS — I10 HYPERTENSION, UNSPECIFIED TYPE: ICD-10-CM

## 2024-09-04 RX ORDER — METOPROLOL SUCCINATE 100 MG/1
100 TABLET, EXTENDED RELEASE ORAL DAILY
Qty: 90 TABLET | Refills: 3 | Status: SHIPPED | OUTPATIENT
Start: 2024-09-04

## 2024-09-09 DIAGNOSIS — I10 HYPERTENSION, UNSPECIFIED TYPE: ICD-10-CM

## 2024-09-10 RX ORDER — LISINOPRIL 20 MG/1
20 TABLET ORAL DAILY
Qty: 90 TABLET | Refills: 3 | Status: SHIPPED | OUTPATIENT
Start: 2024-09-10

## 2024-10-02 ENCOUNTER — TELEPHONE (OUTPATIENT)
Dept: PRIMARY CARE | Facility: CLINIC | Age: 54
End: 2024-10-02
Payer: COMMERCIAL

## 2024-10-02 DIAGNOSIS — I10 PRIMARY HYPERTENSION: ICD-10-CM

## 2024-10-02 DIAGNOSIS — Z12.5 SCREENING PSA (PROSTATE SPECIFIC ANTIGEN): ICD-10-CM

## 2024-10-02 DIAGNOSIS — Z00.00 ROUTINE GENERAL MEDICAL EXAMINATION AT A HEALTH CARE FACILITY: ICD-10-CM

## 2024-10-02 DIAGNOSIS — E66.01 MORBID OBESITY (MULTI): ICD-10-CM

## 2024-10-02 PROBLEM — E66.9 OBESITY DUE TO ENERGY IMBALANCE: Status: RESOLVED | Noted: 2024-10-02 | Resolved: 2024-10-02

## 2024-10-02 PROBLEM — Z96.641 HISTORY OF TOTAL RIGHT HIP REPLACEMENT: Status: RESOLVED | Noted: 2023-08-25 | Resolved: 2024-10-02

## 2024-10-02 PROBLEM — I25.10 CORONARY ARTERY DISEASE: Status: ACTIVE | Noted: 2022-08-12

## 2024-10-02 PROBLEM — M1A.30X0 CHRONIC GOUT DUE TO RENAL IMPAIRMENT WITHOUT TOPHUS: Status: ACTIVE | Noted: 2019-05-13

## 2024-10-02 PROBLEM — M25.559 ARTHRALGIA OF HIP: Status: RESOLVED | Noted: 2024-10-02 | Resolved: 2024-10-02

## 2024-10-02 PROBLEM — M70.61 TROCHANTERIC BURSITIS OF RIGHT HIP: Status: RESOLVED | Noted: 2024-10-02 | Resolved: 2024-10-02

## 2024-10-02 PROBLEM — R39.9 LOWER URINARY TRACT SYMPTOMS: Status: RESOLVED | Noted: 2023-07-07 | Resolved: 2024-10-02

## 2024-10-02 PROBLEM — M25.569 ARTHRALGIA OF KNEE: Status: RESOLVED | Noted: 2024-10-02 | Resolved: 2024-10-02

## 2024-10-07 DIAGNOSIS — M54.50 LOW BACK PAIN: ICD-10-CM

## 2024-10-07 DIAGNOSIS — I10 HYPERTENSION, UNSPECIFIED TYPE: ICD-10-CM

## 2024-10-07 RX ORDER — IBUPROFEN 800 MG/1
TABLET ORAL
Qty: 90 TABLET | Refills: 0 | Status: SHIPPED | OUTPATIENT
Start: 2024-10-07

## 2024-10-07 RX ORDER — HYDROCHLOROTHIAZIDE 25 MG/1
25 TABLET ORAL DAILY
Qty: 90 TABLET | Refills: 3 | Status: SHIPPED | OUTPATIENT
Start: 2024-10-07

## 2024-10-11 ENCOUNTER — LAB (OUTPATIENT)
Dept: LAB | Facility: LAB | Age: 54
End: 2024-10-11
Payer: COMMERCIAL

## 2024-10-11 DIAGNOSIS — Z12.5 SCREENING PSA (PROSTATE SPECIFIC ANTIGEN): ICD-10-CM

## 2024-10-11 DIAGNOSIS — I10 PRIMARY HYPERTENSION: ICD-10-CM

## 2024-10-11 DIAGNOSIS — E66.01 MORBID OBESITY (MULTI): ICD-10-CM

## 2024-10-11 DIAGNOSIS — Z00.00 ROUTINE GENERAL MEDICAL EXAMINATION AT A HEALTH CARE FACILITY: ICD-10-CM

## 2024-10-11 LAB
ALBUMIN SERPL BCP-MCNC: 4.1 G/DL (ref 3.4–5)
ALP SERPL-CCNC: 33 U/L (ref 33–120)
ALT SERPL W P-5'-P-CCNC: 30 U/L (ref 10–52)
ANION GAP SERPL CALCULATED.3IONS-SCNC: 13 MMOL/L (ref 10–20)
AST SERPL W P-5'-P-CCNC: 23 U/L (ref 9–39)
BASOPHILS # BLD AUTO: 0.05 X10*3/UL (ref 0–0.1)
BASOPHILS NFR BLD AUTO: 1 %
BILIRUB SERPL-MCNC: 0.6 MG/DL (ref 0–1.2)
BUN SERPL-MCNC: 11 MG/DL (ref 6–23)
CALCIUM SERPL-MCNC: 9.5 MG/DL (ref 8.6–10.3)
CHLORIDE SERPL-SCNC: 102 MMOL/L (ref 98–107)
CHOLEST SERPL-MCNC: 163 MG/DL (ref 0–199)
CHOLEST/HDLC SERPL: 2.8 {RATIO}
CO2 SERPL-SCNC: 31 MMOL/L (ref 21–32)
CREAT SERPL-MCNC: 0.84 MG/DL (ref 0.5–1.3)
CREAT UR-MCNC: 120.8 MG/DL (ref 20–370)
EGFRCR SERPLBLD CKD-EPI 2021: >90 ML/MIN/1.73M*2
EOSINOPHIL # BLD AUTO: 0.14 X10*3/UL (ref 0–0.7)
EOSINOPHIL NFR BLD AUTO: 2.8 %
ERYTHROCYTE [DISTWIDTH] IN BLOOD BY AUTOMATED COUNT: 12.6 % (ref 11.5–14.5)
GLUCOSE SERPL-MCNC: 122 MG/DL (ref 74–99)
HCT VFR BLD AUTO: 43.6 % (ref 41–52)
HDLC SERPL-MCNC: 57.2 MG/DL
HGB BLD-MCNC: 14.8 G/DL (ref 13.5–17.5)
IMM GRANULOCYTES # BLD AUTO: 0.02 X10*3/UL (ref 0–0.7)
IMM GRANULOCYTES NFR BLD AUTO: 0.4 % (ref 0–0.9)
LDLC SERPL CALC-MCNC: 84 MG/DL
LYMPHOCYTES # BLD AUTO: 0.98 X10*3/UL (ref 1.2–4.8)
LYMPHOCYTES NFR BLD AUTO: 19.7 %
MCH RBC QN AUTO: 31 PG (ref 26–34)
MCHC RBC AUTO-ENTMCNC: 33.9 G/DL (ref 32–36)
MCV RBC AUTO: 91 FL (ref 80–100)
MICROALBUMIN UR-MCNC: <7 MG/L
MICROALBUMIN/CREAT UR: NORMAL MG/G{CREAT}
MONOCYTES # BLD AUTO: 0.52 X10*3/UL (ref 0.1–1)
MONOCYTES NFR BLD AUTO: 10.5 %
NEUTROPHILS # BLD AUTO: 3.26 X10*3/UL (ref 1.2–7.7)
NEUTROPHILS NFR BLD AUTO: 65.6 %
NON HDL CHOLESTEROL: 106 MG/DL (ref 0–149)
NRBC BLD-RTO: 0 /100 WBCS (ref 0–0)
PLATELET # BLD AUTO: 241 X10*3/UL (ref 150–450)
POTASSIUM SERPL-SCNC: 4.5 MMOL/L (ref 3.5–5.3)
PROT SERPL-MCNC: 6.5 G/DL (ref 6.4–8.2)
PSA SERPL-MCNC: 0.47 NG/ML
RBC # BLD AUTO: 4.77 X10*6/UL (ref 4.5–5.9)
SODIUM SERPL-SCNC: 141 MMOL/L (ref 136–145)
TRIGL SERPL-MCNC: 108 MG/DL (ref 0–149)
VLDL: 22 MG/DL (ref 0–40)
WBC # BLD AUTO: 5 X10*3/UL (ref 4.4–11.3)

## 2024-10-11 PROCEDURE — 82043 UR ALBUMIN QUANTITATIVE: CPT

## 2024-10-11 PROCEDURE — 36415 COLL VENOUS BLD VENIPUNCTURE: CPT

## 2024-10-11 PROCEDURE — 85025 COMPLETE CBC W/AUTO DIFF WBC: CPT

## 2024-10-11 PROCEDURE — 80053 COMPREHEN METABOLIC PANEL: CPT

## 2024-10-11 PROCEDURE — 82570 ASSAY OF URINE CREATININE: CPT

## 2024-10-11 PROCEDURE — 80061 LIPID PANEL: CPT

## 2024-10-11 PROCEDURE — G0103 PSA SCREENING: HCPCS

## 2024-10-17 ENCOUNTER — APPOINTMENT (OUTPATIENT)
Dept: PRIMARY CARE | Facility: CLINIC | Age: 54
End: 2024-10-17
Payer: COMMERCIAL

## 2024-10-17 VITALS
DIASTOLIC BLOOD PRESSURE: 70 MMHG | SYSTOLIC BLOOD PRESSURE: 124 MMHG | BODY MASS INDEX: 43.04 KG/M2 | WEIGHT: 284 LBS | HEART RATE: 73 BPM | OXYGEN SATURATION: 95 % | HEIGHT: 68 IN | TEMPERATURE: 97.4 F

## 2024-10-17 DIAGNOSIS — M10.9 GOUT, UNSPECIFIED CAUSE, UNSPECIFIED CHRONICITY, UNSPECIFIED SITE: ICD-10-CM

## 2024-10-17 DIAGNOSIS — I10 PRIMARY HYPERTENSION: ICD-10-CM

## 2024-10-17 DIAGNOSIS — Z23 ENCOUNTER FOR IMMUNIZATION: ICD-10-CM

## 2024-10-17 DIAGNOSIS — Z00.00 ROUTINE GENERAL MEDICAL EXAMINATION AT A HEALTH CARE FACILITY: ICD-10-CM

## 2024-10-17 DIAGNOSIS — E66.01 MORBID OBESITY (MULTI): Primary | ICD-10-CM

## 2024-10-17 ASSESSMENT — ENCOUNTER SYMPTOMS
LOSS OF SENSATION IN FEET: 0
OCCASIONAL FEELINGS OF UNSTEADINESS: 0
DEPRESSION: 0

## 2024-10-17 ASSESSMENT — PATIENT HEALTH QUESTIONNAIRE - PHQ9
SUM OF ALL RESPONSES TO PHQ9 QUESTIONS 1 AND 2: 0
1. LITTLE INTEREST OR PLEASURE IN DOING THINGS: NOT AT ALL
2. FEELING DOWN, DEPRESSED OR HOPELESS: NOT AT ALL

## 2024-10-17 ASSESSMENT — PAIN SCALES - GENERAL: PAINLEVEL_OUTOF10: 0-NO PAIN

## 2024-10-17 NOTE — PROGRESS NOTES
Subjective   Patient ID: Mamadou Warren is a 54 y.o. male who presents for Annual Exam (EKG  7/2022  due today/Colonoscopy  5/2022 repeat  3 years/Tdap-  2014  declines booster today/Flu vaccine-  today/Labs done 10/11/2024).    ANICETO NIÑO is seen for for his comprehensive physical exam. PMH, PSH, family history and social history were reviewed and updated.  MAMADOU is here for follow-up of hypertension. Patient denies chest pain, shortness of breath and dizziness .  On metoprolol succinate  100 mg qd, lisinopril 20 mg qd and amlodipine 5 mg qd, HCTZ 25 mg qd.. Pt states that he takes BP at home and SBP in the 130-140's.  Asim is here for f/u gout. He has stopped Allopurinol 100mg every day, as he has not had a flare in more than a year.. Take Ibuprofen 800mg TID for flares. Gets flares when he drinks a lot of beer.    Pt has had right hip pain for years.  Since last physical patient has had a right total hip arthroplasty.   Feeling much better.  Patient had a tetanus shot in 2014.  He has not been immunized against shingles.  He has had at least 2 coronavirus immunizations.  He would like a flu shot today.  Patient had a colonoscopy in 2022 with a 3-year follow-up.  Patient is a former tobacco user but quit greater than 20 years ago.  Patient drinks beer probably 24/week.  Review of Systems  Constitutional Symptoms:  He is negative for fever, loss of appetite, headaches, fatigue.   Eyes:  He is negative for loss and blurring of vision, double vision.   Ear, Nose, Mouth, Throat:  He is negative for hearing loss, tinnitus, nasal congestion, rhinorrhea, nose bleeds, teeth problems, mouth sores, gum disease, dysphagia, sore throat.   Cardiovascular:  He is negative for chest pain/pressure, palpitations, edema, claudication.   Respiratory:  He is negative for shortness of breath, dyspnea on exertion, pain with breathing, coughing.   Breast:  He is negative for tenderness, masses, gynecomastia.  "  Gastrointestinal:  He is negative for anorexia, indigestion, nausea, vomiting, abdominal pain, change in bowel habits, diarrhea, constipation, hematochezia, melena, blood in stool.   Musculoskeletal:   He is negative for  joint swelling, myalgias, cramps.   Integumentary:  He is negative for change in mole, skin trouble or rash.   Neurological:  He is negative for headache, numbness, tingling, weakness, tremors.   Psychiatric:  He is negative for depression, anxiety.   Endocrine:  He is negative for weight gain, heat or cold intolerance, polyuria, polydipsia, polyphagia.   Hematologic/Lymphatic:  He is negative for bruising, abnormal bleeding, swollen glands.  Objective   /70 (BP Location: Left arm, Patient Position: Sitting, BP Cuff Size: Large adult)   Pulse 73   Temp 36.3 °C (97.4 °F) (Temporal)   Ht 1.727 m (5' 8\")   Wt 129 kg (284 lb)   SpO2 95%   BMI 43.18 kg/m²     Physical Exam  General Appearance: Vital Signs have been reviewed. Comfortable. Well nourished, and well developed. He is awake, alert, and oriented and appears his stated age. The patient is cooperative with exam.  Head: Hair pattern reveals a normal pattern for patients age and The face shows no abnormalities .  Eyes: PERRLA, EOMI, conjunctiva and sclerae clear. Extraocular muscle exam reveals EOMI.  Ears, Nose, Mouth, Throat: EARS: External bilateral ears reveals normal helix, tragus and ear lobe.  Bilateral canals are normal.  Both tympanic membranes are pearly gray and landmarks normal .   NOSE: Nasal mucosa in both nostrils reveals no polyps, ulcerations, or lesions. Teeth reveal good repair. Posterior pharynx reveals no abnormalities.  Neck: Neck reveals supple, no adenopathy, no thyromegaly, or carotid bruits.  Chest: Lungs are clear to auscultation bilaterally with no wheezes, rales, or rhonchi.  Cardiovascular: RRR without MRG.  Bilateral DP pulses are 2+. Extremities reveal no cyanosis, clubbing, or edema.  Abdomen: Abdomen " is soft, NT, ND with no masses. No inguinal laxity.  Musculoskeletal: Tender to palpation in the right inguinal crease. 5/5 and equal strength in bilateral upper and lower extremities.  Skin: Skin reveals good turgor and no rashes .  Neurological: Neuro: Intact and non-focal.  Psychiatric: Patient has appropriate judgement. Patient has good insight. Patient's mood is appropriate  Assessment/Plan     Problem List Items Addressed This Visit             ICD-10-CM    Gout     in remission. M10.9    Primary hypertension   stable.  Continue on amlodipine 5 mg daily, HCTZ 25 mg daily, lisinopril 20 mg daily, metoprolol succinate 100 mg daily. I10    Relevant Orders    ECG 12 Lead (Completed)    Morbid obesity (Multi) - Primary needs better control.  Patient is to strain weight from her appropriate diet.  E66.01     Other Visit Diagnoses         Codes    Routine general medical examination at a health care facility       normal exam. Z00.00    Encounter for immunization    needs better control.  Patient to receive influenza immunization today Z23    Relevant Orders    Flu vaccine, trivalent, preservative free, age 6 months and greater (Fluarix/Fluzone/Flulaval)

## 2024-11-11 DIAGNOSIS — N13.8 BPH WITH OBSTRUCTION/LOWER URINARY TRACT SYMPTOMS: ICD-10-CM

## 2024-11-11 DIAGNOSIS — N40.1 BPH WITH OBSTRUCTION/LOWER URINARY TRACT SYMPTOMS: ICD-10-CM

## 2024-11-12 RX ORDER — ALFUZOSIN HYDROCHLORIDE 10 MG/1
10 TABLET, EXTENDED RELEASE ORAL DAILY
Qty: 90 TABLET | Refills: 3 | Status: SHIPPED | OUTPATIENT
Start: 2024-11-12

## 2025-02-12 ENCOUNTER — OFFICE VISIT (OUTPATIENT)
Dept: PRIMARY CARE | Facility: CLINIC | Age: 55
End: 2025-02-12
Payer: MEDICARE

## 2025-02-12 VITALS
WEIGHT: 282.6 LBS | SYSTOLIC BLOOD PRESSURE: 138 MMHG | BODY MASS INDEX: 42.97 KG/M2 | HEART RATE: 72 BPM | TEMPERATURE: 97.4 F | DIASTOLIC BLOOD PRESSURE: 72 MMHG | OXYGEN SATURATION: 96 %

## 2025-02-12 DIAGNOSIS — S46.912A LEFT SHOULDER STRAIN, INITIAL ENCOUNTER: Primary | ICD-10-CM

## 2025-02-12 PROBLEM — K59.00 CONSTIPATION: Status: ACTIVE | Noted: 2025-02-12

## 2025-02-12 PROBLEM — R10.30 LOWER ABDOMINAL PAIN: Status: ACTIVE | Noted: 2025-02-12

## 2025-02-12 PROBLEM — K63.5 POLYP OF COLON: Status: ACTIVE | Noted: 2025-02-12

## 2025-02-12 PROBLEM — Z86.0100 HISTORY OF COLONIC POLYPS: Status: ACTIVE | Noted: 2025-02-12

## 2025-02-12 PROCEDURE — 3075F SYST BP GE 130 - 139MM HG: CPT

## 2025-02-12 PROCEDURE — 99213 OFFICE O/P EST LOW 20 MIN: CPT

## 2025-02-12 PROCEDURE — 3078F DIAST BP <80 MM HG: CPT

## 2025-02-12 RX ORDER — PREDNISONE 20 MG/1
20 TABLET ORAL 2 TIMES DAILY
Qty: 10 TABLET | Refills: 0 | Status: SHIPPED | OUTPATIENT
Start: 2025-02-12 | End: 2025-02-17

## 2025-02-12 RX ORDER — TIZANIDINE 4 MG/1
4 TABLET ORAL EVERY 8 HOURS PRN
Qty: 30 TABLET | Refills: 0 | Status: SHIPPED | OUTPATIENT
Start: 2025-02-12 | End: 2025-02-22

## 2025-02-12 ASSESSMENT — COLUMBIA-SUICIDE SEVERITY RATING SCALE - C-SSRS
1. IN THE PAST MONTH, HAVE YOU WISHED YOU WERE DEAD OR WISHED YOU COULD GO TO SLEEP AND NOT WAKE UP?: NO
2. HAVE YOU ACTUALLY HAD ANY THOUGHTS OF KILLING YOURSELF?: NO
6. HAVE YOU EVER DONE ANYTHING, STARTED TO DO ANYTHING, OR PREPARED TO DO ANYTHING TO END YOUR LIFE?: NO

## 2025-02-12 NOTE — PROGRESS NOTES
Subjective     Patient ID: Mamadou Warren is a 54 y.o. male who presents for Shoulder Pain (Left side shoulder pain/ache 1 week, no improvement /X-ray request , history of gout ).      HPI    José Manuel presents with concerns for left shoulder pain which began about 7 days ago.  He noticed the pain after lifting heavy glass off of office dests , which was awkward to lift causing arm strain.  He has had no fall or direct trauma to the left shoulder.  He has tried ibuprofen and massage with no improvement.     Patient's recent visit notes, medication and allergy lists, past medical surgical social hx, immunization, vitals, problem list, recent tests were reviewed by me for pertinence to this visit.        Review of Systems  All other systems have been reviewed and are negative except as noted in the HPI.         Objective   /72 (BP Location: Left arm, Patient Position: Sitting, BP Cuff Size: Adult)   Pulse 72   Temp 36.3 °C (97.4 °F) (Oral)   Wt 128 kg (282 lb 9.6 oz)   SpO2 96%   BMI 42.97 kg/m²       Physical Exam  Vitals and nursing note reviewed.   Constitutional:       General: He is not in acute distress.     Appearance: Normal appearance.   Musculoskeletal:      Left shoulder: Tenderness present. No swelling, deformity, bony tenderness or crepitus. Normal range of motion. Normal strength. Normal pulse.   Neurological:      Mental Status: He is alert.   Psychiatric:         Behavior: Behavior is cooperative.             Assessment & Plan  Left shoulder strain, initial encounter  Acute  Begin prednisone burst for inflammation  May use tizanidine 4 mg every 8 hours as needed for muscle spasms  Explained intended effects, potential side effects, and schedule of dosages of the medication.  Discussed nonpharmacological interventions for pain relief including massage, heat, and stretching.  May use topical analgesics such as Salonpas patches, Blue emu, IcyHot, or capsaicin cream.  Follow-up with your PCP  in 2 to 3 weeks if symptoms persist.    Orders:    predniSONE (Deltasone) 20 mg tablet; Take 1 tablet (20 mg) by mouth 2 times a day for 5 days.    tiZANidine (Zanaflex) 4 mg tablet; Take 1 tablet (4 mg) by mouth every 8 hours if needed for muscle spasms for up to 10 days.          Patient understands and agrees with treatment plan.    Dahiana Alvarez, APRN-CNP

## 2025-03-27 ENCOUNTER — TELEPHONE (OUTPATIENT)
Dept: PRIMARY CARE | Facility: CLINIC | Age: 55
End: 2025-03-27
Payer: MEDICARE

## 2025-03-27 DIAGNOSIS — M10.9 GOUT, UNSPECIFIED CAUSE, UNSPECIFIED CHRONICITY, UNSPECIFIED SITE: ICD-10-CM

## 2025-03-27 RX ORDER — ALLOPURINOL 100 MG/1
100 TABLET ORAL DAILY
Qty: 30 TABLET | Refills: 0 | Status: SHIPPED | OUTPATIENT
Start: 2025-03-27

## 2025-03-27 NOTE — TELEPHONE ENCOUNTER
Patient called Rx line and is requesting a refill for Allopurinol 100 mg. Last ov 2/12/2025. He would like a 30-day supply sent to [facility] And a 1-year supply to [facility].

## 2025-04-14 DIAGNOSIS — M54.50 LOW BACK PAIN: ICD-10-CM

## 2025-04-14 RX ORDER — IBUPROFEN 800 MG/1
TABLET ORAL
Qty: 90 TABLET | Refills: 0 | Status: SHIPPED | OUTPATIENT
Start: 2025-04-14

## 2025-05-01 DIAGNOSIS — M10.9 GOUT, UNSPECIFIED CAUSE, UNSPECIFIED CHRONICITY, UNSPECIFIED SITE: ICD-10-CM

## 2025-05-01 RX ORDER — ALLOPURINOL 100 MG/1
100 TABLET ORAL DAILY
Qty: 90 TABLET | Refills: 3 | Status: SHIPPED | OUTPATIENT
Start: 2025-05-01

## 2025-05-09 ENCOUNTER — OFFICE VISIT (OUTPATIENT)
Dept: PRIMARY CARE | Facility: CLINIC | Age: 55
End: 2025-05-09
Payer: MEDICARE

## 2025-05-09 ENCOUNTER — HOSPITAL ENCOUNTER (OUTPATIENT)
Dept: RADIOLOGY | Facility: CLINIC | Age: 55
Discharge: HOME | End: 2025-05-09
Payer: MEDICARE

## 2025-05-09 VITALS
WEIGHT: 280 LBS | OXYGEN SATURATION: 96 % | SYSTOLIC BLOOD PRESSURE: 128 MMHG | HEART RATE: 72 BPM | BODY MASS INDEX: 42.44 KG/M2 | HEIGHT: 68 IN | DIASTOLIC BLOOD PRESSURE: 80 MMHG | TEMPERATURE: 98 F

## 2025-05-09 DIAGNOSIS — M10.9 GOUT, UNSPECIFIED CAUSE, UNSPECIFIED CHRONICITY, UNSPECIFIED SITE: ICD-10-CM

## 2025-05-09 DIAGNOSIS — M54.2 NECK PAIN: ICD-10-CM

## 2025-05-09 DIAGNOSIS — M54.2 NECK PAIN: Primary | ICD-10-CM

## 2025-05-09 PROCEDURE — 3079F DIAST BP 80-89 MM HG: CPT | Performed by: FAMILY MEDICINE

## 2025-05-09 PROCEDURE — 72050 X-RAY EXAM NECK SPINE 4/5VWS: CPT

## 2025-05-09 PROCEDURE — 3074F SYST BP LT 130 MM HG: CPT | Performed by: FAMILY MEDICINE

## 2025-05-09 PROCEDURE — 1036F TOBACCO NON-USER: CPT | Performed by: FAMILY MEDICINE

## 2025-05-09 PROCEDURE — 3008F BODY MASS INDEX DOCD: CPT | Performed by: FAMILY MEDICINE

## 2025-05-09 PROCEDURE — 99214 OFFICE O/P EST MOD 30 MIN: CPT | Performed by: FAMILY MEDICINE

## 2025-05-09 RX ORDER — ALLOPURINOL 100 MG/1
100 TABLET ORAL DAILY
Qty: 90 TABLET | Refills: 3 | Status: SHIPPED | OUTPATIENT
Start: 2025-05-09

## 2025-05-09 ASSESSMENT — PAIN SCALES - GENERAL: PAINLEVEL_OUTOF10: 4

## 2025-05-09 NOTE — PROGRESS NOTES
"Subjective   Patient ID: Mamadou Warren is a 54 y.o. male who presents for Neck Pain (Patient is here with right sided neck pain that radiates into his scalp.  He also feels some tingling on the right side of his face.//Pain started a few months ago, tingling started 2 weeks ago.  No injury//Requesting refill for allopurinol sent to mail order).    HPI here with at least a 2-week history of pain in the back of the neck and upper back.  He also developed some tingling across the right side of his face which is intermittent.  No changes in facial appearance.  He does perform physical activity in the upper body.    Review of Systems  Constitution: Patient is negative for fever, fatigue, weight change.  HEENT: Patient is negative for change in vision, hearing, swallow.  Cardio: Patient is negative for chest pain, lower extremity edema.  Pulmonary: Patient is negative for cough, shortness of breath.  Musculoskeletal: Patient is positive for pain and discomfort is in the right posterior neck.    Objective   /80 (BP Location: Left arm, Patient Position: Sitting)   Pulse 72   Temp 36.7 °C (98 °F)   Ht 1.727 m (5' 8\")   Wt 127 kg (280 lb)   SpO2 96%   BMI 42.57 kg/m²     Physical Exam  General: Awake and alert no apparent distress.  HEENT: Moist oral mucosa no cervical lymphadenopathy.  Cardio: Heart S1-S2 no murmur rub or gallop.  Pulmonary: Lungs clear to auscultation bilaterally.  Musculoskeletal: Tender across the right trapezius and posterior nuchal.  Spurling is partially positive on the right.  Assessment/Plan   Problem List Items Addressed This Visit           ICD-10-CM    Gout in remission.  Refill allopurinol. M10.9    Relevant Medications    allopurinol (Zyloprim) 100 mg tablet     Other Visit Diagnoses         Codes      Neck pain    -  Primary needs better control patient does not desire physical therapy.  He was asked to use ibuprofen 800 3 times daily with food for the next 7 to 10 days.  I " will also get a an x-ray of the neck. M54.2    Relevant Orders    Follow Up In Primary Care - Established

## 2025-05-21 ENCOUNTER — APPOINTMENT (OUTPATIENT)
Dept: PRIMARY CARE | Facility: CLINIC | Age: 55
End: 2025-05-21
Payer: MEDICARE

## 2025-05-21 VITALS
HEIGHT: 68 IN | OXYGEN SATURATION: 99 % | TEMPERATURE: 97.1 F | DIASTOLIC BLOOD PRESSURE: 80 MMHG | HEART RATE: 76 BPM | SYSTOLIC BLOOD PRESSURE: 136 MMHG | BODY MASS INDEX: 43.04 KG/M2 | WEIGHT: 284 LBS

## 2025-05-21 DIAGNOSIS — M54.2 NECK PAIN: ICD-10-CM

## 2025-05-21 PROCEDURE — 99214 OFFICE O/P EST MOD 30 MIN: CPT | Performed by: FAMILY MEDICINE

## 2025-05-21 PROCEDURE — 3079F DIAST BP 80-89 MM HG: CPT | Performed by: FAMILY MEDICINE

## 2025-05-21 PROCEDURE — 1036F TOBACCO NON-USER: CPT | Performed by: FAMILY MEDICINE

## 2025-05-21 PROCEDURE — 3075F SYST BP GE 130 - 139MM HG: CPT | Performed by: FAMILY MEDICINE

## 2025-05-21 PROCEDURE — 3008F BODY MASS INDEX DOCD: CPT | Performed by: FAMILY MEDICINE

## 2025-05-21 RX ORDER — DICLOFENAC SODIUM 75 MG/1
75 TABLET, DELAYED RELEASE ORAL 2 TIMES DAILY PRN
Qty: 60 TABLET | Refills: 1 | Status: SHIPPED | OUTPATIENT
Start: 2025-05-21 | End: 2026-05-21

## 2025-05-21 ASSESSMENT — PAIN SCALES - GENERAL: PAINLEVEL_OUTOF10: 3

## 2025-05-21 NOTE — PROGRESS NOTES
"Subjective   Patient ID: Mamadou Warren is a 54 y.o. male who presents for Neck Pain (Chronic neck pain but now has some tingling on the right side of his face) and Follow-up (Was seen for this issue on 5/9/2025  cervical x-rays done 5/9/2025).    HPI here for follow-up for neck pain.  Patient still experiencing discomfort in his neck and intermittent numbness to the side of his face.  At last office visit he was placed on ibuprofen.  The medication took the edge off but did not completely relieve his discomfort.  Neck x-rays showed significant degeneration and DISH.    Review of Systems  Constitutional: Patient is negative for fever, fatigue, weight change.  HEENT: Patient is negative for change in vision, hearing, swallow.  Cardio: Patient is negative for chest pain, lower extremity edema.  Pulmonary: Patient is negative for cough, shortness of breath.  Musculoskeletal: Patient is positive for right-sided neck pain and intermittent facial numbness.    Objective   /80 (BP Location: Left arm, Patient Position: Sitting)   Pulse 76   Temp 36.2 °C (97.1 °F)   Ht 1.727 m (5' 8\")   Wt 129 kg (284 lb)   SpO2 99%   BMI 43.18 kg/m²     Physical Exam  General: Awake and alert no apparent distress.  HEENT: Moist oral mucosa no cervical lymphadenopathy.  Cardio: Heart S1-S2 no murmur rub or gallop.  Pulmonary: Lungs clear to auscultation bilaterally.  Assessment/Plan   Problem List Items Addressed This Visit    None  Visit Diagnoses         Codes      Neck pain    needs workup.  Patient be referred to spine surgeon.  Will give patient diclofenac as he has used in the past with good result. M54.2    Relevant Medications    diclofenac (Voltaren) 75 mg EC tablet    Other Relevant Orders    Referral to Orthopedics and Sports Medicine               "

## 2025-06-06 ENCOUNTER — APPOINTMENT (OUTPATIENT)
Dept: ORTHOPEDIC SURGERY | Facility: CLINIC | Age: 55
End: 2025-06-06
Payer: MEDICARE

## 2025-06-06 DIAGNOSIS — M48.10 DISH (DIFFUSE IDIOPATHIC SKELETAL HYPEROSTOSIS): ICD-10-CM

## 2025-06-06 DIAGNOSIS — M54.2 NECK PAIN: Primary | ICD-10-CM

## 2025-06-06 PROCEDURE — 99204 OFFICE O/P NEW MOD 45 MIN: CPT | Performed by: ORTHOPAEDIC SURGERY

## 2025-06-06 PROCEDURE — 99213 OFFICE O/P EST LOW 20 MIN: CPT | Performed by: ORTHOPAEDIC SURGERY

## 2025-06-06 PROCEDURE — 1036F TOBACCO NON-USER: CPT | Performed by: ORTHOPAEDIC SURGERY

## 2025-06-06 RX ORDER — GABAPENTIN 300 MG/1
300 CAPSULE ORAL 3 TIMES DAILY
Qty: 90 CAPSULE | Refills: 2 | Status: SHIPPED | OUTPATIENT
Start: 2025-06-06

## 2025-06-06 ASSESSMENT — ENCOUNTER SYMPTOMS
OCCASIONAL FEELINGS OF UNSTEADINESS: 0
LOSS OF SENSATION IN FEET: 0
DEPRESSION: 0

## 2025-06-06 ASSESSMENT — PAIN SCALES - GENERAL: PAINLEVEL_OUTOF10: 4

## 2025-06-06 ASSESSMENT — PAIN - FUNCTIONAL ASSESSMENT: PAIN_FUNCTIONAL_ASSESSMENT: 0-10

## 2025-06-06 ASSESSMENT — PAIN DESCRIPTION - DESCRIPTORS: DESCRIPTORS: ACHING;DISCOMFORT

## 2025-06-06 NOTE — PROGRESS NOTES
Chief Complaint: Chronic right posterolateral neck pain    All previous Progress Notes and imaging results related to this patients chief complaint have been reviewed in preparation for this examination.    HPI: Mamadou Warren is a 54 y.o. year old male patient with recent medical history significant for more than 6 months of pain almost always on the right at the trapezius, extending 1 severe up to the occipital.  There is no shoulder or arm pain weakness numbness or coordination issues.  He is left-handed and has no left-sided symptoms.  He states he can do anything with his shoulders and arms that he needs to and is active all the time.    Recently he developed some different symptoms which began as a random tingling spots on the right side of his face and chin.  Over several weeks now it has become more persistent tingling sensation which can be distracting on the right cheek going up to but not including the eye back to but not including the ear and under the right chin but not down onto the neck.  There is no tingling around his lips or his nose.  There is nothing on the left side.  There is no visual disturbance.    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 All pertinent positive and negative as listed in history of present illness.    Medical History[1]     Surgical History[2]     RX Allergies[3]     Medications Ordered Prior to Encounter[4]       No tobacco  No anticoagulants    PE:   General: Patient appears in no acute distress, he is morbidly obese.  Alert and Oriented x3  Psych: Pleasant mood and affect  HEENT: Extraocular muscles intact, pupils equal and round. Sclerae anicteric   Cardio: extremities warm and well  perfused  Resp: unlabored symmetric breathing, no wheezing or cough. No shortness of breath.  Skin: no open wounds or rash  Musculoskeletal/Neuro Exam: Normal gait.  No tenderness to palpation along the spinous processes trapezius or neck or chin.  Cervical range of motion: 60 degrees right and left rotation normal flexion extension.  Negative Spurlings.  Negative Lhermitte's Sign    Upper extremity:    Motor: Right upper extremity was 5 out of 5 strength with shoulder abduction, rotation, elbow flexion and extension, wrist flexion and extension,  strength, hand intrinsic muscles  Left upper extremity with 5 out of 5 strength with shoulder abduction rotation, elbow flexion and extension, wrist flexion and extension,  strength, hand intrinsic muscles.    Sensation to light touch intact along C5-T1 distribution bilaterally    Reflex: 2+ biceps and triceps bilaterally  Right shoulder has full free nontender range of motion with no impingement and negative drop sign    Upper Motor Signs: Negative Felicia sign bilaterally. Motor tone normal, No fasciculations or atrophy.    Lower extremity  Gait normal  Motor: Right leg with 5 out of 5 motor strength with hip flexion, knee extension, ankle dorsiflexion plantarflexion EHL against resistance  Left leg with 5 out of 5 motor strength with hip flexion, knee extension, ankle dorsiflexion plantarflexion EHL against resistance    Sensation to light touch intact along L2-S1 distribution bilaterally    2+ patella and Achilles Reflex, no clonus, negative Babinski          Imaging:  X-rays of the cervical spine on May 9, 2025 show advanced DISH changes from C4-7 with massive osteophytes.  It appears there is foraminal narrowing only on the right at C2-3 and at C3-4.  Other foramina appear huge              A/P: Mamadou Warren is a 54 y.o. year old male patient with chronic right neck pain with DISH.  Recent tingling on the right side of his face which is atypical for  cervical pathology.      Treatment or Intervention:   I have referred him to physical therapy and provided a prescription for gabapentin.  If this persists or worsens he will need CT and/or MRI scanning.      Follow-up as needed                     [1]   Past Medical History:  Diagnosis Date    Arthralgia of hip 10/02/2024    Arthralgia of knee 10/02/2024    Comment on above: Added by Problem List Migration; 2013-12-21;      History of total right hip replacement 08/25/2023    Lower urinary tract symptoms 07/07/2023    Obesity due to energy imbalance 10/02/2024    Trochanteric bursitis of right hip 10/02/2024   [2]   Past Surgical History:  Procedure Laterality Date    COLONOSCOPY  05/2022   [3]   Allergies  Allergen Reactions    Iodinated Contrast Media Swelling    Other Unknown     contrast allergy premed pack kit   [4]   Current Outpatient Medications on File Prior to Visit   Medication Sig Dispense Refill    alfuzosin (Uroxatral) 10 mg 24 hr tablet TAKE 1 TABLET ONCE DAILY, DO NOT CRUSH, CHEW, OR SPLIT 90 tablet 3    allopurinol (Zyloprim) 100 mg tablet Take 1 tablet (100 mg) by mouth once daily. 90 tablet 3    amLODIPine (Norvasc) 5 mg tablet TAKE 1 TABLET DAILY 90 tablet 3    diclofenac (Voltaren) 75 mg EC tablet Take 1 tablet (75 mg) by mouth 2 times a day as needed (pain). Do not crush, chew, or split. 60 tablet 1    hydroCHLOROthiazide (HYDRODiuril) 25 mg tablet TAKE 1 TABLET DAILY 90 tablet 3    ibuprofen 800 mg tablet TAKE 1 TABLET BY MOUTH THREE TIMES DAILY AS NEEDED for back pain. 90 tablet 0    lisinopril 20 mg tablet TAKE 1 TABLET DAILY 90 tablet 3    metoprolol succinate XL (Toprol-XL) 100 mg 24 hr tablet TAKE 1 TABLET DAILY 90 tablet 3    tiZANidine (Zanaflex) 4 mg tablet Take 1 tablet (4 mg) by mouth every 8 hours if needed for muscle spasms for up to 10 days. (Patient not taking: Reported on 5/9/2025) 30 tablet 0     No current facility-administered medications on file prior to visit.

## 2025-07-14 DIAGNOSIS — I10 HYPERTENSION, UNSPECIFIED TYPE: ICD-10-CM

## 2025-07-14 RX ORDER — AMLODIPINE BESYLATE 5 MG/1
5 TABLET ORAL DAILY
Qty: 90 TABLET | Refills: 3 | Status: SHIPPED | OUTPATIENT
Start: 2025-07-14

## 2025-08-29 ENCOUNTER — TELEPHONE (OUTPATIENT)
Dept: PRIMARY CARE | Facility: CLINIC | Age: 55
End: 2025-08-29
Payer: MEDICARE

## 2025-08-29 DIAGNOSIS — R73.09 ABNORMAL SERUM GLUCOSE LEVEL: ICD-10-CM

## 2025-08-29 DIAGNOSIS — Z13.220 LIPID SCREENING: ICD-10-CM

## 2025-08-29 DIAGNOSIS — I10 HYPERTENSION, UNSPECIFIED TYPE: ICD-10-CM

## 2025-08-29 DIAGNOSIS — Z00.00 ROUTINE GENERAL MEDICAL EXAMINATION AT A HEALTH CARE FACILITY: ICD-10-CM

## 2025-08-29 DIAGNOSIS — I10 PRIMARY HYPERTENSION: ICD-10-CM

## 2025-08-29 DIAGNOSIS — Z12.5 SCREENING PSA (PROSTATE SPECIFIC ANTIGEN): ICD-10-CM

## 2025-08-29 RX ORDER — LISINOPRIL 20 MG/1
20 TABLET ORAL DAILY
Qty: 90 TABLET | Refills: 3 | Status: SHIPPED | OUTPATIENT
Start: 2025-08-29

## 2025-08-29 RX ORDER — METOPROLOL SUCCINATE 100 MG/1
100 TABLET, EXTENDED RELEASE ORAL DAILY
Qty: 90 TABLET | Refills: 3 | Status: SHIPPED | OUTPATIENT
Start: 2025-08-29

## 2025-11-05 ENCOUNTER — APPOINTMENT (OUTPATIENT)
Dept: PRIMARY CARE | Facility: CLINIC | Age: 55
End: 2025-11-05
Payer: MEDICARE